# Patient Record
Sex: MALE | Race: WHITE | NOT HISPANIC OR LATINO | Employment: FULL TIME | URBAN - METROPOLITAN AREA
[De-identification: names, ages, dates, MRNs, and addresses within clinical notes are randomized per-mention and may not be internally consistent; named-entity substitution may affect disease eponyms.]

---

## 2021-07-14 ENCOUNTER — MEDICAL CORRESPONDENCE (OUTPATIENT)
Dept: HEALTH INFORMATION MANAGEMENT | Facility: CLINIC | Age: 31
End: 2021-07-14

## 2021-07-14 ENCOUNTER — CONTACT MOVED (OUTPATIENT)
Age: 31
End: 2021-07-14

## 2024-11-04 ENCOUNTER — APPOINTMENT (OUTPATIENT)
Dept: RADIOLOGY | Facility: HOSPITAL | Age: 34
End: 2024-11-04

## 2024-11-04 ENCOUNTER — HOSPITAL ENCOUNTER (EMERGENCY)
Facility: HOSPITAL | Age: 34
Discharge: HOME | End: 2024-11-04

## 2024-11-04 VITALS
RESPIRATION RATE: 16 BRPM | OXYGEN SATURATION: 98 % | HEIGHT: 66 IN | TEMPERATURE: 98.1 F | SYSTOLIC BLOOD PRESSURE: 135 MMHG | BODY MASS INDEX: 33.46 KG/M2 | WEIGHT: 208.2 LBS | DIASTOLIC BLOOD PRESSURE: 78 MMHG | HEART RATE: 69 BPM

## 2024-11-04 DIAGNOSIS — M79.672 LEFT FOOT PAIN: ICD-10-CM

## 2024-11-04 PROCEDURE — 73630 X-RAY EXAM OF FOOT: CPT | Mod: LT

## 2024-11-04 PROCEDURE — 99283 EMERGENCY DEPT VISIT LOW MDM: CPT

## 2024-11-04 PROCEDURE — 250N000013 HC RX MED GY IP 250 OP 250 PS 637

## 2024-11-04 RX ORDER — ACETAMINOPHEN 325 MG/1
325 TABLET ORAL ONCE
Status: COMPLETED | OUTPATIENT
Start: 2024-11-04 | End: 2024-11-04

## 2024-11-04 RX ADMIN — ACETAMINOPHEN 325 MG: 325 TABLET ORAL at 20:20

## 2024-11-04 ASSESSMENT — COLUMBIA-SUICIDE SEVERITY RATING SCALE - C-SSRS
1. IN THE PAST MONTH, HAVE YOU WISHED YOU WERE DEAD OR WISHED YOU COULD GO TO SLEEP AND NOT WAKE UP?: NO
6. HAVE YOU EVER DONE ANYTHING, STARTED TO DO ANYTHING, OR PREPARED TO DO ANYTHING TO END YOUR LIFE?: NO
2. HAVE YOU ACTUALLY HAD ANY THOUGHTS OF KILLING YOURSELF IN THE PAST MONTH?: NO

## 2024-11-04 NOTE — Clinical Note
Nithin Quispe was seen and treated in our emergency department on 11/4/2024.  He may return to work on 11/11/2024.       If you have any questions or concerns, please don't hesitate to call.      Betty Zelaya, KAYLEEN

## 2024-11-05 NOTE — ED TRIAGE NOTES
Patient arrives from home. Reports left sided localized foot pain. Pain is on top of foot where patient noticed a lump. Noticed it about 1 month ago. Today he noticed intermittent numbness in left great and 2nd toe. Patient ambulatory but walks with pain and limp. No known injury, no medications prior to arrival.     Triage Assessment (Adult)       Row Name 11/04/24 1923          Triage Assessment    Airway WDL WDL        Respiratory WDL    Respiratory WDL WDL        Cardiac WDL    Cardiac WDL WDL        Cognitive/Neuro/Behavioral WDL    Cognitive/Neuro/Behavioral WDL WDL

## 2024-11-05 NOTE — ED PROVIDER NOTES
EMERGENCY DEPARTMENT ENCOUNTER      NAME: Nithin Quispe  AGE: 34 year old male  YOB: 1990  MRN: [id number]  EVALUATION DATE & TIME: No admission date for patient encounter.    PCP: Cullen Nix    ED PROVIDER: Betty Zelaya PA-C      Chief Complaint   Patient presents with    Foot Pain     FINAL IMPRESSION:  1. Left foot pain      ED COURSE & MEDICAL DECISION MAKING:    Pertinent Labs & Imaging studies reviewed. (See chart for details)  34 year old male presents to the Emergency Department for evaluation of pain.  For the past month patient has had left dorsal foot pain over his first metatarsal.  Patient reports that he is on his feet 10 hours a day for his job and noticed it to be gradually worsening over time.  Patient denies any trauma or injuries preceding his symptoms.  He describes the pain as a constant ache.  Patient noted intermittent decreased sensation of the great toe and second toe. Vital signs reviewed and patient is hypertensive most likely secondary to pain.  Afebrile.  On exam patient is tender to palpation over the left first metatarsal.  No deformity.  No overlying erythema, edema, ecchymosis.  No lacerations or abrasions.  Normal warmth.  Normal range of motion, sensation and strength of the left lower extremity.  Pulses are 2+ bilaterally.  Normal capillary refill.    Differential diagnosis includes fracture, contusion, ligamental injury.  No signs of infection.  Compartments are soft.  Received Tylenol which improved his symptoms.  X-ray of the left foot shows no definitive fracture is identified.  Normal joint spacing and alignment. Given walking boot for comfort.  Patient was referred to Ortho.  Patient will follow-up with [facility] to discussed his ED visit.  Patient will return to the ED if new symptoms develop or symptoms worsen.  Patient agrees with plan.  All questions answered.  Patient will follow-up with your primary care doctor to discuss his ED  visit.      ED COURSE:   8:08 PM I saw the patient.   8:58 PM Patient was educated on his results.  Patient be discharged home.  Patient agrees with plan.  All questions answered.       At the conclusion of the encounter I discussed the results of all of the tests and the disposition. The questions were answered. The patient or family acknowledged understanding and was agreeable with the care plan.     0 minutes of critical care time       Medical Decision Making  Obtained supplemental history:Supplemental history obtained?: No  Reviewed external records: External records reviewed?: Documented in chart  Care impacted by chronic illness:Smoking / Nicotine Use  Care significantly affected by social determinants of health:Access to Medical Care  Did you consider but not order tests?: Work up considered but not performed and documented in chart, if applicable  Did you interpret images independently?: Independent interpretation of ECG and images noted in documentation, when applicable.  Consultation discussion with other provider:Did you involve another provider (consultant, MH, pharmacy, etc.)?: I discussed the care with another health care provider, see documentation for details.  Discharge. No recommendations on prescription strength medication(s). See documentation for any additional details.    Not Applicable    MEDICATIONS GIVEN IN THE EMERGENCY:  Medications   acetaminophen (TYLENOL) tablet 325 mg (325 mg Oral $Given 11/4/24 2020)       NEW PRESCRIPTIONS STARTED AT TODAY'S ER VISIT  There are no discharge medications for this patient.  =================================================================    HPI    Patient information was obtained from: patient    Use of : N/A         Nithin TOLLIVER Jose Enrique is a 34 year old male with a pertinent history of tobacco use disorder who presents to this ED via private vehicle with friend for evaluation of foot pain.    Patient reports about 1 month ago, he started to  "develop localized left dorsal foot pain. Patient does typically stand about 10 hours a day for his job. He notes that the pain has gradually worsened since onset. He denies any known trauma or injury preceding symptoms. Describes the pain as a constant ache. He associates intermittent numbness in the great toe and the 2nd toe. He also notes that there is a \"bump\" on the dorsal left foot that has gotten bigger. On exam, there is an equally sized bump on either foot. He hasn't taken any pain medications PTA. There were no other concerns/complaints at this time.      REVIEW OF SYSTEMS   As per HPI    PAST MEDICAL HISTORY:  History reviewed. No pertinent past medical history.    PAST SURGICAL HISTORY:  History reviewed. No pertinent surgical history.        CURRENT MEDICATIONS:    No current outpatient medications on file.      ALLERGIES:  No Known Allergies    FAMILY HISTORY:  History reviewed. No pertinent family history.    SOCIAL HISTORY:   Social History     Socioeconomic History    Marital status: Single   Tobacco Use    Smoking status: Every Day     Types: Cigarettes    Smokeless tobacco: Current    Tobacco comments:     1 pack every 3 days       VITALS:  /78   Pulse 69   Temp 98.1  F (36.7  C) (Oral)   Resp 16   Ht 1.676 m (5' 6\")   Wt 94.4 kg (208 lb 3.2 oz)   SpO2 98%   BMI 33.60 kg/m      PHYSICAL EXAM    Physical Exam  Vitals and nursing note reviewed.   Constitutional:       Appearance: Normal appearance.   HENT:      Head: Atraumatic.      Right Ear: External ear normal.      Left Ear: External ear normal.      Nose: Nose normal.      Mouth/Throat:      Mouth: Mucous membranes are moist.   Eyes:      Conjunctiva/sclera: Conjunctivae normal.      Pupils: Pupils are equal, round, and reactive to light.   Cardiovascular:      Rate and Rhythm: Normal rate and regular rhythm.      Pulses: Normal pulses.      Heart sounds: Normal heart sounds. No murmur heard.     No friction rub. No gallop. "   Pulmonary:      Effort: Pulmonary effort is normal.      Breath sounds: Normal breath sounds. No wheezing or rales.   Abdominal:      Tenderness: There is no abdominal tenderness. There is no guarding or rebound.   Musculoskeletal:      Cervical back: Normal range of motion.      Comments: Left first metatarsal is mildly tender to palpation.  The left lower extremity is nontender to palpation.  No overlying erythema, edema, ecchymosis.  No lacerations or abrasions.  Normal warmth.  Pedal pulses are 2+ bilaterally.  Normal capillary refill.  Normal range of motion, sensation and strength of the left lower extremity.   Skin:     General: Skin is dry.   Neurological:      Mental Status: He is alert. Mental status is at baseline.   Psychiatric:         Mood and Affect: Mood normal.         Thought Content: Thought content normal.          LAB:  All pertinent labs reviewed and interpreted.  Labs Ordered and Resulted from Time of ED Arrival to Time of ED Departure - No data to display     RADIOLOGY:  Reviewed all pertinent imaging. Please see official radiology report.  XR Foot Left G/E 3 Views   Final Result   IMPRESSION: No definite fracture is identified. Normal joint spaces and alignment.          EKG:    None    PROCEDURES:   None    I, Sarina Kennedy, am serving as a scribe to document services personally performed by Betty Zelaya PA-C, based on my observation and the provider's statements to me. IBetty PA-C, attest that Sarina Kennedy is acting in a scribe capacity, has observed my performance of the services and has documented them in accordance with my direction.    Betty Zelaya PA-C  [facility] EMERGENCY DEPARTMENT  [address] [place] [id number]  [phone]     Betty Zelaya PA-C  11/15/24 7023

## 2024-11-05 NOTE — H&P (VIEW-ONLY)
EMERGENCY DEPARTMENT ENCOUNTER      NAME: Nithin Quispe  AGE: 34 year old male  YOB: 1990  MRN: [id number]  EVALUATION DATE & TIME: No admission date for patient encounter.    PCP: Cullen Nix    ED PROVIDER: Betty Zelaya PA-C      Chief Complaint   Patient presents with    Foot Pain     FINAL IMPRESSION:  1. Left foot pain      ED COURSE & MEDICAL DECISION MAKING:    Pertinent Labs & Imaging studies reviewed. (See chart for details)  34 year old male presents to the Emergency Department for evaluation of pain.  For the past month patient has had left dorsal foot pain over his first metatarsal.  Patient reports that he is on his feet 10 hours a day for his job and noticed it to be gradually worsening over time.  Patient denies any trauma or injuries preceding his symptoms.  He describes the pain as a constant ache.  Patient noted intermittent decreased sensation of the great toe and second toe. Vital signs reviewed and patient is hypertensive most likely secondary to pain.  Afebrile.  On exam patient is tender to palpation over the left first metatarsal.  No deformity.  No overlying erythema, edema, ecchymosis.  No lacerations or abrasions.  Normal warmth.  Normal range of motion, sensation and strength of the left lower extremity.  Pulses are 2+ bilaterally.  Normal capillary refill.    Differential diagnosis includes fracture, contusion, ligamental injury.  No signs of infection.  Compartments are soft.  Received Tylenol which improved his symptoms.  X-ray of the left foot shows no definitive fracture is identified.  Normal joint spacing and alignment. Given walking boot for comfort.  Patient was referred to Ortho.  Patient will follow-up with [facility] to discussed his ED visit.  Patient will return to the ED if new symptoms develop or symptoms worsen.  Patient agrees with plan.  All questions answered.  Patient will follow-up with your primary care doctor to discuss his ED  visit.      ED COURSE:   8:08 PM I saw the patient.   8:58 PM Patient was educated on his results.  Patient be discharged home.  Patient agrees with plan.  All questions answered.       At the conclusion of the encounter I discussed the results of all of the tests and the disposition. The questions were answered. The patient or family acknowledged understanding and was agreeable with the care plan.     0 minutes of critical care time       Medical Decision Making  Obtained supplemental history:Supplemental history obtained?: No  Reviewed external records: External records reviewed?: Documented in chart  Care impacted by chronic illness:Smoking / Nicotine Use  Care significantly affected by social determinants of health:Access to Medical Care  Did you consider but not order tests?: Work up considered but not performed and documented in chart, if applicable  Did you interpret images independently?: Independent interpretation of ECG and images noted in documentation, when applicable.  Consultation discussion with other provider:Did you involve another provider (consultant, MH, pharmacy, etc.)?: I discussed the care with another health care provider, see documentation for details.  Discharge. No recommendations on prescription strength medication(s). See documentation for any additional details.    Not Applicable    MEDICATIONS GIVEN IN THE EMERGENCY:  Medications   acetaminophen (TYLENOL) tablet 325 mg (325 mg Oral $Given 11/4/24 2020)       NEW PRESCRIPTIONS STARTED AT TODAY'S ER VISIT  There are no discharge medications for this patient.  =================================================================    HPI    Patient information was obtained from: patient    Use of : N/A         Nithin TOLLIVER Jose Enrique is a 34 year old male with a pertinent history of tobacco use disorder who presents to this ED via private vehicle with friend for evaluation of foot pain.    Patient reports about 1 month ago, he started to  "develop localized left dorsal foot pain. Patient does typically stand about 10 hours a day for his job. He notes that the pain has gradually worsened since onset. He denies any known trauma or injury preceding symptoms. Describes the pain as a constant ache. He associates intermittent numbness in the great toe and the 2nd toe. He also notes that there is a \"bump\" on the dorsal left foot that has gotten bigger. On exam, there is an equally sized bump on either foot. He hasn't taken any pain medications PTA. There were no other concerns/complaints at this time.      REVIEW OF SYSTEMS   As per HPI    PAST MEDICAL HISTORY:  History reviewed. No pertinent past medical history.    PAST SURGICAL HISTORY:  History reviewed. No pertinent surgical history.        CURRENT MEDICATIONS:    No current outpatient medications on file.      ALLERGIES:  No Known Allergies    FAMILY HISTORY:  History reviewed. No pertinent family history.    SOCIAL HISTORY:   Social History     Socioeconomic History    Marital status: Single   Tobacco Use    Smoking status: Every Day     Types: Cigarettes    Smokeless tobacco: Current    Tobacco comments:     1 pack every 3 days       VITALS:  /78   Pulse 69   Temp 98.1  F (36.7  C) (Oral)   Resp 16   Ht 1.676 m (5' 6\")   Wt 94.4 kg (208 lb 3.2 oz)   SpO2 98%   BMI 33.60 kg/m      PHYSICAL EXAM    Physical Exam  Vitals and nursing note reviewed.   Constitutional:       Appearance: Normal appearance.   HENT:      Head: Atraumatic.      Right Ear: External ear normal.      Left Ear: External ear normal.      Nose: Nose normal.      Mouth/Throat:      Mouth: Mucous membranes are moist.   Eyes:      Conjunctiva/sclera: Conjunctivae normal.      Pupils: Pupils are equal, round, and reactive to light.   Cardiovascular:      Rate and Rhythm: Normal rate and regular rhythm.      Pulses: Normal pulses.      Heart sounds: Normal heart sounds. No murmur heard.     No friction rub. No gallop. "   Pulmonary:      Effort: Pulmonary effort is normal.      Breath sounds: Normal breath sounds. No wheezing or rales.   Abdominal:      Tenderness: There is no abdominal tenderness. There is no guarding or rebound.   Musculoskeletal:      Cervical back: Normal range of motion.      Comments: Left first metatarsal is mildly tender to palpation.  The left lower extremity is nontender to palpation.  No overlying erythema, edema, ecchymosis.  No lacerations or abrasions.  Normal warmth.  Pedal pulses are 2+ bilaterally.  Normal capillary refill.  Normal range of motion, sensation and strength of the left lower extremity.   Skin:     General: Skin is dry.   Neurological:      Mental Status: He is alert. Mental status is at baseline.   Psychiatric:         Mood and Affect: Mood normal.         Thought Content: Thought content normal.          LAB:  All pertinent labs reviewed and interpreted.  Labs Ordered and Resulted from Time of ED Arrival to Time of ED Departure - No data to display     RADIOLOGY:  Reviewed all pertinent imaging. Please see official radiology report.  XR Foot Left G/E 3 Views   Final Result   IMPRESSION: No definite fracture is identified. Normal joint spaces and alignment.          EKG:    None    PROCEDURES:   None    I, Sarina Kennedy, am serving as a scribe to document services personally performed by Betty Zelaya PA-C, based on my observation and the provider's statements to me. IBetty PA-C, attest that Sarina Kennedy is acting in a scribe capacity, has observed my performance of the services and has documented them in accordance with my direction.    Betty Zelaya PA-C  [facility] EMERGENCY DEPARTMENT  [address] [place] [id number]  [phone]     Betty Zelaya PA-C  11/15/24 0755

## 2024-11-05 NOTE — DISCHARGE INSTRUCTIONS
Rest.  Orally hydrate.  Follow-up with your primary care doctor discuss treatment visit.  Return to the ED if new symptoms develop or symptoms worsen.  Wear your walking boot for comfort.  Follow-up with [facility] to discuss your ED visit.

## 2024-11-07 ENCOUNTER — HOSPITAL ENCOUNTER (OUTPATIENT)
Facility: AMBULATORY SURGERY CENTER | Age: 34
End: 2024-11-07

## 2024-11-07 DIAGNOSIS — M67.472 GANGLION OF FOOT, LEFT: Primary | ICD-10-CM

## 2024-11-19 ENCOUNTER — ANESTHESIA EVENT (OUTPATIENT)
Dept: SURGERY | Facility: AMBULATORY SURGERY CENTER | Age: 34
End: 2024-11-19

## 2024-11-19 ENCOUNTER — ANESTHESIA (OUTPATIENT)
Dept: SURGERY | Facility: AMBULATORY SURGERY CENTER | Age: 34
End: 2024-11-19

## 2024-11-19 VITALS
HEIGHT: 66 IN | RESPIRATION RATE: 18 BRPM | OXYGEN SATURATION: 97 % | WEIGHT: 212 LBS | SYSTOLIC BLOOD PRESSURE: 149 MMHG | TEMPERATURE: 96.8 F | BODY MASS INDEX: 34.07 KG/M2 | DIASTOLIC BLOOD PRESSURE: 97 MMHG

## 2024-11-19 RX ORDER — DEXAMETHASONE SODIUM PHOSPHATE 4 MG/ML
INJECTION, SOLUTION INTRA-ARTICULAR; INTRALESIONAL; INTRAMUSCULAR; INTRAVENOUS; SOFT TISSUE PRN
Status: DISCONTINUED | OUTPATIENT
Start: 2024-11-19 | End: 2024-11-19

## 2024-11-19 RX ORDER — FENTANYL CITRATE 0.05 MG/ML
25 INJECTION, SOLUTION INTRAMUSCULAR; INTRAVENOUS
Status: DISCONTINUED | OUTPATIENT
Start: 2024-11-19 | End: 2024-11-23 | Stop reason: HOSPADM

## 2024-11-19 RX ORDER — SODIUM CHLORIDE, SODIUM LACTATE, POTASSIUM CHLORIDE, CALCIUM CHLORIDE 600; 310; 30; 20 MG/100ML; MG/100ML; MG/100ML; MG/100ML
INJECTION, SOLUTION INTRAVENOUS CONTINUOUS
Status: DISCONTINUED | OUTPATIENT
Start: 2024-11-19 | End: 2024-11-23 | Stop reason: HOSPADM

## 2024-11-19 RX ORDER — OXYCODONE HYDROCHLORIDE 5 MG/1
5 TABLET ORAL
Status: COMPLETED | OUTPATIENT
Start: 2024-11-19 | End: 2024-11-19

## 2024-11-19 RX ORDER — DEXAMETHASONE SODIUM PHOSPHATE 4 MG/ML
4 INJECTION, SOLUTION INTRA-ARTICULAR; INTRALESIONAL; INTRAMUSCULAR; INTRAVENOUS; SOFT TISSUE
Status: DISCONTINUED | OUTPATIENT
Start: 2024-11-19 | End: 2024-11-23 | Stop reason: HOSPADM

## 2024-11-19 RX ORDER — NALOXONE HYDROCHLORIDE 0.4 MG/ML
0.1 INJECTION, SOLUTION INTRAMUSCULAR; INTRAVENOUS; SUBCUTANEOUS
Status: DISCONTINUED | OUTPATIENT
Start: 2024-11-19 | End: 2024-11-23 | Stop reason: HOSPADM

## 2024-11-19 RX ORDER — ONDANSETRON 4 MG/1
4 TABLET, ORALLY DISINTEGRATING ORAL EVERY 30 MIN PRN
Status: DISCONTINUED | OUTPATIENT
Start: 2024-11-19 | End: 2024-11-23 | Stop reason: HOSPADM

## 2024-11-19 RX ORDER — ONDANSETRON 2 MG/ML
INJECTION INTRAMUSCULAR; INTRAVENOUS PRN
Status: DISCONTINUED | OUTPATIENT
Start: 2024-11-19 | End: 2024-11-19

## 2024-11-19 RX ORDER — CEFAZOLIN SODIUM 2 G/100ML
INJECTION, SOLUTION INTRAVENOUS PRN
Status: DISCONTINUED | OUTPATIENT
Start: 2024-11-19 | End: 2024-11-19

## 2024-11-19 RX ORDER — ACETAMINOPHEN 325 MG/1
975 TABLET ORAL ONCE
Status: COMPLETED | OUTPATIENT
Start: 2024-11-19 | End: 2024-11-19

## 2024-11-19 RX ORDER — LIDOCAINE 40 MG/G
CREAM TOPICAL
Status: DISCONTINUED | OUTPATIENT
Start: 2024-11-19 | End: 2024-11-23 | Stop reason: HOSPADM

## 2024-11-19 RX ORDER — OXYCODONE HYDROCHLORIDE 10 MG/1
10 TABLET ORAL
Status: COMPLETED | OUTPATIENT
Start: 2024-11-19 | End: 2024-11-19

## 2024-11-19 RX ORDER — PROPOFOL 10 MG/ML
INJECTION, EMULSION INTRAVENOUS PRN
Status: DISCONTINUED | OUTPATIENT
Start: 2024-11-19 | End: 2024-11-19

## 2024-11-19 RX ORDER — ONDANSETRON 2 MG/ML
4 INJECTION INTRAMUSCULAR; INTRAVENOUS EVERY 30 MIN PRN
Status: DISCONTINUED | OUTPATIENT
Start: 2024-11-19 | End: 2024-11-23 | Stop reason: HOSPADM

## 2024-11-19 RX ORDER — PROPOFOL 10 MG/ML
INJECTION, EMULSION INTRAVENOUS CONTINUOUS PRN
Status: DISCONTINUED | OUTPATIENT
Start: 2024-11-19 | End: 2024-11-19

## 2024-11-19 RX ORDER — FENTANYL CITRATE 50 UG/ML
INJECTION, SOLUTION INTRAMUSCULAR; INTRAVENOUS PRN
Status: DISCONTINUED | OUTPATIENT
Start: 2024-11-19 | End: 2024-11-19

## 2024-11-19 RX ORDER — OXYCODONE HYDROCHLORIDE 5 MG/1
5 TABLET ORAL EVERY 6 HOURS PRN
Qty: 6 TABLET | Refills: 0 | Status: SHIPPED | OUTPATIENT
Start: 2024-11-19

## 2024-11-19 RX ORDER — LIDOCAINE HYDROCHLORIDE 20 MG/ML
INJECTION, SOLUTION INFILTRATION; PERINEURAL PRN
Status: DISCONTINUED | OUTPATIENT
Start: 2024-11-19 | End: 2024-11-19

## 2024-11-19 RX ADMIN — OXYCODONE HYDROCHLORIDE 5 MG: 10 TABLET ORAL at 14:48

## 2024-11-19 RX ADMIN — FENTANYL CITRATE 25 MCG: 50 INJECTION, SOLUTION INTRAMUSCULAR; INTRAVENOUS at 13:17

## 2024-11-19 RX ADMIN — PROPOFOL 40 MG: 10 INJECTION, EMULSION INTRAVENOUS at 13:11

## 2024-11-19 RX ADMIN — OXYCODONE HYDROCHLORIDE 5 MG: 5 TABLET ORAL at 14:17

## 2024-11-19 RX ADMIN — ONDANSETRON 4 MG: 2 INJECTION INTRAMUSCULAR; INTRAVENOUS at 13:27

## 2024-11-19 RX ADMIN — ACETAMINOPHEN 975 MG: 325 TABLET ORAL at 12:59

## 2024-11-19 RX ADMIN — FENTANYL CITRATE 50 MCG: 50 INJECTION, SOLUTION INTRAMUSCULAR; INTRAVENOUS at 13:24

## 2024-11-19 RX ADMIN — FENTANYL CITRATE 25 MCG: 50 INJECTION, SOLUTION INTRAMUSCULAR; INTRAVENOUS at 13:12

## 2024-11-19 RX ADMIN — PROPOFOL 20 MG: 10 INJECTION, EMULSION INTRAVENOUS at 13:28

## 2024-11-19 RX ADMIN — LIDOCAINE HYDROCHLORIDE 60 MG: 20 INJECTION, SOLUTION INFILTRATION; PERINEURAL at 13:10

## 2024-11-19 RX ADMIN — DEXAMETHASONE SODIUM PHOSPHATE 4 MG: 4 INJECTION, SOLUTION INTRA-ARTICULAR; INTRALESIONAL; INTRAMUSCULAR; INTRAVENOUS; SOFT TISSUE at 13:27

## 2024-11-19 RX ADMIN — PROPOFOL 50 MG: 10 INJECTION, EMULSION INTRAVENOUS at 13:18

## 2024-11-19 RX ADMIN — CEFAZOLIN SODIUM 2 G: 2 INJECTION, SOLUTION INTRAVENOUS at 13:15

## 2024-11-19 RX ADMIN — PROPOFOL 200 MCG/KG/MIN: 10 INJECTION, EMULSION INTRAVENOUS at 13:12

## 2024-11-19 RX ADMIN — SODIUM CHLORIDE, SODIUM LACTATE, POTASSIUM CHLORIDE, CALCIUM CHLORIDE: 600; 310; 30; 20 INJECTION, SOLUTION INTRAVENOUS at 12:59

## 2024-11-19 ASSESSMENT — LIFESTYLE VARIABLES: TOBACCO_USE: 1

## 2024-11-19 NOTE — ANESTHESIA PREPROCEDURE EVALUATION
"Anesthesia Pre-Procedure Evaluation    Patient: Nithin Quispe   MRN: [id number] : 1990        Procedure : Procedure(s):  EXCISION CYST LEFT FOOT, EXOSTECTOMY AS NEEDED LEFT FOOT          Past Medical History:   Diagnosis Date    Obese       History reviewed. No pertinent surgical history.   No Known Allergies   Social History     Tobacco Use    Smoking status: Every Day     Types: Cigarettes    Smokeless tobacco: Current    Tobacco comments:     1 pack every 3 days   Substance Use Topics    Alcohol use: Not Currently     Comment: none      Wt Readings from Last 1 Encounters:   24 96.2 kg (212 lb)        Anesthesia Evaluation   Pt has not had prior anesthetic         ROS/MED HX  ENT/Pulmonary:     (+)                tobacco use, Current use,                       Neurologic:     (+)    peripheral neuropathy, - Left toes secondary to cyst.                           Cardiovascular:  - neg cardiovascular ROS     METS/Exercise Tolerance:     Hematologic:  - neg hematologic  ROS     Musculoskeletal:  - neg musculoskeletal ROS     GI/Hepatic:  - neg GI/hepatic ROS     Renal/Genitourinary:  - neg Renal ROS     Endo:     (+)               Obesity,       Psychiatric/Substance Use:  - neg psychiatric ROS     Infectious Disease:  - neg infectious disease ROS     Malignancy:  - neg malignancy ROS     Other:  - neg other ROS          Physical Exam    Airway  airway exam normal      Mallampati: III   TM distance: > 3 FB   Neck ROM: full   Mouth opening: > 3 cm    Respiratory Devices and Support         Dental  no notable dental history     (+) Minor Abnormalities - some fillings, tiny chips      Cardiovascular   cardiovascular exam normal       Rhythm and rate: regular and normal     Pulmonary   pulmonary exam normal        breath sounds clear to auscultation           OUTSIDE LABS:  CBC: No results found for: \"WBC\", \"HGB\", \"HCT\", \"PLT\"  BMP:   Lab Results   Component Value Date    GLC 91 2014     COAGS: No " "results found for: \"PTT\", \"INR\", \"FIBR\"  POC: No results found for: \"BGM\", \"HCG\", \"HCGS\"  HEPATIC: No results found for: \"ALBUMIN\", \"PROTTOTAL\", \"ALT\", \"AST\", \"GGT\", \"ALKPHOS\", \"BILITOTAL\", \"BILIDIRECT\", \"ANALI\"  OTHER: No results found for: \"PH\", \"LACT\", \"A1C\", \"SAMMY\", \"PHOS\", \"MAG\", \"LIPASE\", \"AMYLASE\", \"TSH\", \"T4\", \"T3\", \"CRP\", \"SED\"    Anesthesia Plan    ASA Status:  2    NPO Status:  NPO Appropriate    Anesthesia Type: MAC.     - Reason for MAC: straight local not clinically adequate              Consents    Anesthesia Plan(s) and associated risks, benefits, and realistic alternatives discussed. Questions answered and patient/representative(s) expressed understanding.     - Discussed:     - Discussed with:  Patient            Postoperative Care    Pain management: IV analgesics, Oral pain medications, Multi-modal analgesia.   PONV prophylaxis: Ondansetron (or other 5HT-3), Dexamethasone or Solumedrol, Droperidol or Haldol     Comments:               Galdino Coburn MD    I have reviewed the pertinent notes and labs in the chart from the past 30 days and (re)examined the patient.  Any updates or changes from those notes are reflected in this note.                         # Obesity: Estimated body mass index is 34.22 kg/m  as calculated from the following:    Height as of this encounter: 1.676 m (5' 6\").    Weight as of this encounter: 96.2 kg (212 lb).             "

## 2024-11-19 NOTE — ANESTHESIA CARE TRANSFER NOTE
Patient: Nithin Quispe    Procedure: Procedure(s):  EXCISION GANGLION CYST LEFT FOOT       Diagnosis: Ganglion of foot, left [M67.472]  Diagnosis Additional Information: No value filed.    Anesthesia Type:   MAC     Note:    Oropharynx: oropharynx clear of all foreign objects and spontaneously breathing  Level of Consciousness: awake  Oxygen Supplementation: room air    Independent Airway: airway patency satisfactory and stable  Dentition: dentition unchanged  Vital Signs Stable: post-procedure vital signs reviewed and stable  Report to RN Given: handoff report given  Patient transferred to: Phase II    Handoff Report: Identifed the Patient, Identified the Reponsible Provider, Reviewed the pertinent medical history, Discussed the surgical course, Reviewed Intra-OP anesthesia mangement and issues during anesthesia, Set expectations for post-procedure period and Allowed opportunity for questions and acknowledgement of understanding      Vitals:  Vitals Value Taken Time   /85 11/19/24 1351   Temp 96.8  F (36  C) 11/19/24 1350   Pulse     Resp 18 11/19/24 1350   SpO2 97    Vitals shown include unfiled device data.    Electronically Signed By: ANJELICA Patricio CRNA  November 19, 2024  1:53 PM

## 2024-11-19 NOTE — DISCHARGE INSTRUCTIONS
If you have any questions or concerns regarding your procedure please contact Dr. Alicia Almanza DPM, her office number is [phone].    You have received 975 mg of Acetaminophen (Tylenol) at 12:59 PM. Please do not take an additional dose of Tylenol until after 6:59 PM.     Do not exceed 4,000 mg of acetaminophen during a 24 hour period and keep in mind that acetaminophen can also be found in many over-the-counter cold medications as well as narcotics that may be given for pain.    You received a medication called Toradol (a stronger IV ibuprofen) at 1:50 PM. Do NOT take any Ibuprofen / Advil / Aleve / Naproxen or products containing Ibuprofen until 7:50 PM or later.    You had 10 mg of oxycodone at 2:48 PM.           [facility]   Adult Discharge Orders & Instructions     For 24 hours after surgery    Get plenty of rest.  A responsible adult must stay with you for at least 24 hours after you leave the hospital.   Do not drive or use heavy equipment.  If you have weakness or tingling, don't drive or use heavy equipment until this feeling goes away.  Do not drink alcohol.  Avoid strenuous or risky activities.  Ask for help when climbing stairs.   You may feel lightheaded.  IF so, sit for a few minutes before standing.  Have someone help you get up.   If you have nausea (feel sick to your stomach): Drink only clear liquids such as apple juice, ginger ale, broth or 7-Up.  Rest may also help.  Be sure to drink enough fluids.  Move to a regular diet as you feel able.  You may have a slight fever. Call the doctor if your fever is over 100 F (37.7 C) (taken under the tongue) or lasts longer than 24 hours.  You may have a dry mouth, a sore throat, muscle aches or trouble sleeping.  These should go away after 24 hours.  Do not make important or legal decisions.     Call your doctor for any of the followin.  Signs of infection (fever, growing tenderness at the surgery site, a large amount of drainage  or bleeding, severe pain, foul-smelling drainage, redness, swelling).    2. It has been over 8 to 10 hours since surgery and you are still not able to urinate (pass water).    3.  Headache for over 24 hours.

## 2024-11-19 NOTE — ANESTHESIA POSTPROCEDURE EVALUATION
Patient: Nithin Quispe    Procedure: Procedure(s):  EXCISION GANGLION CYST LEFT FOOT       Anesthesia Type:  MAC    Note:  Disposition: Outpatient   Postop Pain Control: Uneventful            Sign Out: Well controlled pain   PONV: No   Neuro/Psych: Uneventful            Sign Out: Acceptable/Baseline neuro status   Airway/Respiratory: Uneventful            Sign Out: Acceptable/Baseline resp. status   CV/Hemodynamics: Uneventful            Sign Out: Acceptable CV status; No obvious hypovolemia; No obvious fluid overload   Other NRE: NONE   DID A NON-ROUTINE EVENT OCCUR? No           Last vitals:  Vitals Value Taken Time   /97 11/19/24 1505   Temp 96.8  F (36  C) 11/19/24 1350   Pulse 52 11/19/24 1503   Resp 18 11/19/24 1350   SpO2 97 % 11/19/24 1505   Vitals shown include unfiled device data.    Electronically Signed By: Galdino Coburn MD  November 19, 2024  3:22 PM

## 2024-11-19 NOTE — INTERVAL H&P NOTE
"I have reviewed the surgical (or preoperative) H&P that is linked to this encounter, and examined the patient. There are no significant changes    Clinical Conditions Present on Arrival:  Clinically Significant Risk Factors Present on Admission                       # Obesity: Estimated body mass index is 34.22 kg/m  as calculated from the following:    Height as of this encounter: 1.676 m (5' 6\").    Weight as of this encounter: 96.2 kg (212 lb).       "

## 2024-11-19 NOTE — OP NOTE
"Patient name: Nithin Quispe  MRN: [id number]    Date of Surgery: 2024    Surgeon: Alicia Almanza DPM    Assistants: None    PREOPERATIVE DIAGNOSIS:   1. Left foot soft tissue mass    POSTOPERATIVE DIAGNOSIS:   1. Left foot ganglion cyst    OPERATION:   1. Excision ganglion cyst left foot    STAFF: Circulator: Kassie Aleman RN  Scrub Person: Ronaldo Conrad    TYPE OF ANESTHESIA:  Local \"V\" block and IV sedation     IMPLANTS: * No implants in log *    ESTIMATED BLOOD LOSS: 2ml    TOURNIQUET TIME: < 30 minutes     COMPLICATION: None.     INDICATIONS: Nithin Quispe is a 34 year old male who has a new onset soft tissue cyst to the top of the left midfoot. This is very tender for him, makes working and wearing shoes uncomfortable.  After a thorough evaluation, the patient was indicated for operative intervention. The risks, benefits, and alternatives were discussed with the patient.  The patient verbalized understanding of the treatment plan and signed a written consent.     DESCRIPTION OF PROCEDURE: The left lower extremity was identified and marked by myself in the preop holing area.The patient was taken to the operating room and placed the supine position on the operating table. Anesthesia was administered by the Department of Anesthesia and perioperative antibiotics were administered.      A left ankle tourniquet was applied to the operative extremity, which was subsequently prepped and draped in the usual sterile fashion.  A timeout was performed, indicating the patient name, , operative side and procedure to be performed, with all OR staff and all were in agreement. The extremity was exsanguinated with an Esmarch bandage and the tourniquet was elevated to 250 mmHg.     Attention was drawn to the left dorsal midfoot where a soft tissue mass was appreciated. This measured approx 1 x 1cm and was semi-firm on palpation. Dorsalis pedis artery was marked prior and avoided for the incision. Incision made " overlying the mass and overlying cutaneous nerve was immediately visible and overlying the mass. This was dissected and retracted. Soft tissue mass with gelatinous fluid was appreciated to the muscular layer and was carefully dissected and removed. Specimen sent for pathology. No underlying osseous structures were prominent, exostectomy not necessary.Incision irrigated. Incision closed in layers.    The tourniquet was let down and a hyperexmic response was appreciated to the lower extremity. Sterile dressings were applied. The patient was aroused from anesthesia and taken to the recovery room in stable condition.    Alicia Almanza DPM